# Patient Record
Sex: FEMALE | Race: WHITE | NOT HISPANIC OR LATINO | URBAN - METROPOLITAN AREA
[De-identification: names, ages, dates, MRNs, and addresses within clinical notes are randomized per-mention and may not be internally consistent; named-entity substitution may affect disease eponyms.]

---

## 2021-08-16 ENCOUNTER — APPOINTMENT (RX ONLY)
Dept: URBAN - METROPOLITAN AREA CLINIC 23 | Facility: CLINIC | Age: 51
Setting detail: DERMATOLOGY
End: 2021-08-16

## 2021-08-16 DIAGNOSIS — Z41.9 ENCOUNTER FOR PROCEDURE FOR PURPOSES OTHER THAN REMEDYING HEALTH STATE, UNSPECIFIED: ICD-10-CM

## 2021-08-16 PROCEDURE — ? PICOWAY LASER

## 2021-08-16 NOTE — PROCEDURE: PICOWAY LASER
Spot Size: 6.0 mm
Spot Size: 4.0 mm
Wavelength: 532 nm
Treatment Number: 1
Total Pulses: [address] [facility]
Total Pulses: 2 passes 725 pulse
Spot Size: 2.0 mm
Spot Size: 6.5 mm
Post-Care Instructions: I reviewed with the patient in detail post-care instructions. Patient should avoid sun for a minimum of 4 weeks before and after treatment.
External Cooling Fan Speed: 0
Frequency (Hz): 6Hz
Handpiece: Resolve
Fluence (J/Cm2): 2
Hide Pulse Duration?: No
Fluence (J/Cm2): 1.9
Fluence (J/Cm2): 0.6
Pulse Duration: 2.5 ms
Handpiece: n/a nm
Pre-Procedure: Prior to proceeding the treatment areas were cleaned and all present put on their eye protection.
Post-Procedure Care: Immediate endpoint: perifollicular erythema and edema. Vaseline and ice applied. Post care reviewed with patient.
Wavelength: 1064 nm
Detail Level: Detailed
Anesthesia Type: 1% lidocaine with epinephrine
Consent: Written consent obtained, risks reviewed including but not limited to crusting, scabbing, blistering, scarring, darker or lighter pigmentary change, paradoxical hair regrowth, incomplete removal of hair and infection.

## 2021-09-28 ENCOUNTER — APPOINTMENT (RX ONLY)
Dept: URBAN - METROPOLITAN AREA CLINIC 23 | Facility: CLINIC | Age: 51
Setting detail: DERMATOLOGY
End: 2021-09-28

## 2021-09-28 DIAGNOSIS — Z41.9 ENCOUNTER FOR PROCEDURE FOR PURPOSES OTHER THAN REMEDYING HEALTH STATE, UNSPECIFIED: ICD-10-CM

## 2021-09-28 PROCEDURE — ? PICOWAY LASER

## 2021-09-28 NOTE — PROCEDURE: PICOWAY LASER
Detail Level: Detailed
External Cooling Fan Speed: 0
Anesthesia Type: 1% lidocaine with epinephrine
Total Pulses: 2 passes 725 pulse
Spot Size: 2.0 mm
Wavelength: 532 nm
Post-Care Instructions: I reviewed with the patient in detail post-care instructions. Patient should avoid sun for a minimum of 4 weeks before and after treatment.
Handpiece: Resolve
Handpiece: Zoom
Location Override: spot
Hide Pulse Duration?: No
Fluence (J/Cm2): 0.7
Fluence (J/Cm2): 2.1
Frequency (Hz): Nichelle Nettles
Frequency (Hz): 1Hz
Pulse Duration: 2.5 ms
Pre-Procedure: Prior to proceeding the treatment areas were cleaned and all present put on their eye protection.
Handpiece: n/a nm
Fluence (J/Cm2): 2
Spot Size: 6.5 mm
Spot Size: 4.0 mm
Post-Procedure Care: Immediate endpoint: perifollicular erythema and edema. Vaseline and ice applied. Post care reviewed with patient.
Spot Size: 6.0 mm
Consent: Written consent obtained, risks reviewed including but not limited to crusting, scabbing, blistering, scarring, darker or lighter pigmentary change, paradoxical hair regrowth, incomplete removal of hair and infection.
Wavelength: 1064 nm

## 2021-11-08 ENCOUNTER — APPOINTMENT (RX ONLY)
Dept: URBAN - METROPOLITAN AREA CLINIC 23 | Facility: CLINIC | Age: 51
Setting detail: DERMATOLOGY
End: 2021-11-08

## 2021-11-08 DIAGNOSIS — Z41.9 ENCOUNTER FOR PROCEDURE FOR PURPOSES OTHER THAN REMEDYING HEALTH STATE, UNSPECIFIED: ICD-10-CM

## 2021-11-08 PROCEDURE — ? PICOWAY LASER

## 2021-11-08 PROCEDURE — ? IN-HOUSE DISPENSING PHARMACY

## 2021-11-08 NOTE — PROCEDURE: PICOWAY LASER
Pulse Duration: 2.5 ms
Wavelength: 1064 nm
Handpiece: n/a nm
Treatment Number: 3
Post-Procedure Care: Immediate endpoint: perifollicular erythema and edema. Vaseline and ice applied. Post care reviewed with patient.
Wavelength: 532 nm
External Cooling Fan Speed: 0
Spot Size: 6.5 mm
Consent: Written consent obtained, risks reviewed including but not limited to crusting, scabbing, blistering, scarring, darker or lighter pigmentary change, paradoxical hair regrowth, incomplete removal of hair and infection.
Spot Size: 6.0 mm
Detail Level: Detailed
Anesthesia Type: 1% lidocaine with epinephrine
Frequency (Hz): 6Hz
Frequency (Hz): 6 Hz
Spot Size: 2.0 mm
Post-Care Instructions: I reviewed with the patient in detail post-care instructions. Patient should avoid sun for a minimum of 4 weeks before and after treatment.
Total Pulses: 2 passes 725 pulse
Handpiece: Resolve
Pre-Procedure: Prior to proceeding the treatment areas were cleaned and all present put on their eye protection.
Fluence (J/Cm2): 2
Fluence (J/Cm2): 2.1
Fluence (J/Cm2): 1.7
Hide Pulse Duration?: No

## 2021-11-08 NOTE — PROCEDURE: IN-HOUSE DISPENSING PHARMACY
Product 49 Price/Unit (In Dollars): 0
Product 2 Unit Type: tablets
Product 75 Unit Type: mg
Product 11 Unit Type: bottle(s)
Product 9 Amount/Unit (Numbers Only): 1
Product 14 Unit Type: tube(s)
Name Of Product 3: Tretinoin 0.05% cream
Product 7 Application Directions: Apply to lashes at bedtime daily as indicated
Name Of Product 12: Retinol Lite
Product 7 Unit Type: ml
Name Of Product 8: TNS Essential
Product 1 Price/Unit (In Dollars): 0.00
Product 12 Application Directions: Apply a pea size amount to the entire face at bedtime.
Product 3 Application Directions: Mix with cerave cream and apply on damp skin daily to body.
Product 3 Unit Type: grams
Product 22 Application Directions: Apply to the affected area of the face
Product 13 Application Directions: Apply to the entire face in the Am and pm
Product 6 Application Directions: Take one tablet for swelling today at the office as indicated and one tablet tomorrow
Name Of Product 11: Clearing gel
Name Of Product 2: Valtrex
Name Of Product 14: Skin Better Interfuse eye treatment cream
Name Of Product 7: Latisse 5ml
Product 2 Application Directions: apply to affected areas left lower leg am and pm  as indicated
Product 11 Application Directions: Apply a thin layer to the acne prone areas in the AM
Name Of Product 19: Diazepam 5mg
Product 14 Application Directions: Apply to the under eyes in the Am and pm
Render Product Pricing In Note: No
Product 21 Application Directions: Take one tablet 30 minutes prior to procedure
Product 9 Unit Type: jar(s)
Product 7 Amount/Unit (Numbers Only): 5
Name Of Product 10: Valtrex 500 mg
Name Of Product 1: Hydroquinone 4% / Tretinoin 0.05% / Fluocinolone 0.01%
Product 5 Application Directions: Take one tablet today after procedure with full glass of water as indicated
Name Of Product 13: Skin Better Even tone correcting serum
Name Of Product 22: Brightening Pads
Product 3 Amount/Unit (Numbers Only): [address] [facility]
Name Of Product 6: Prednisone 20mg
Product 10 Application Directions: Take 1 tablet 2 times daily for 3 days
Product 1 Application Directions: Apply thin layer to right cheek every night  at bedtime only.
Name Of Product 4: Valium 5 mg
Product 8 Application Directions: Apply to the clean face in the AM and PM daily
Name Of Product 16: Brightening pads 4%
Name Of Product 9: Clearing tone pads
Product 4 Application Directions: Take one tablet half hour today prior to procedure as indicated.  Dispense in office
Detail Level: Zone
Product 16 Application Directions: Apply to face area once daily as indicated
Name Of Product 21: Valium 5mg
Product 2 Amount/Unit (Numbers Only): 6
Name Of Product 5: loratadine 10 mg
Product 9 Application Directions: Apply to the acne prone areas in the Am and Pm

## 2022-01-24 ENCOUNTER — APPOINTMENT (RX ONLY)
Dept: URBAN - METROPOLITAN AREA CLINIC 23 | Facility: CLINIC | Age: 52
Setting detail: DERMATOLOGY
End: 2022-01-24

## 2022-01-24 DIAGNOSIS — Z41.9 ENCOUNTER FOR PROCEDURE FOR PURPOSES OTHER THAN REMEDYING HEALTH STATE, UNSPECIFIED: ICD-10-CM

## 2022-01-24 PROCEDURE — ? IN-HOUSE DISPENSING PHARMACY

## 2022-01-24 NOTE — PROCEDURE: IN-HOUSE DISPENSING PHARMACY
Product 46 Amount/Unit (Numbers Only): 0
Name Of Product 9: Clearing tone pads
Product 17 Unit Type: mg
Product 2 Unit Type: tablets
Name Of Product 3: Tretinoin 0.05% cream
Product 7 Application Directions: Apply to lashes at bedtime daily as indicated
Product 13 Application Directions: Apply to the entire face in the Am and pm
Product 22 Application Directions: Apply to the affected area of the face
Product 11 Refills: 1
Product 3 Units Dispensed: 2
Product 7 Unit Type: ml
Product 13 Unit Type: bottle(s)
Product 1 Price/Unit (In Dollars): 0.00
Product 9 Application Directions: Apply to the acne prone areas in the Am and Pm
Name Of Product 8: TNS Essential
Product 3 Application Directions: Apply a pea size amount to the entire face at night
Name Of Product 14: Skin Better Interfuse eye treatment cream
Product 9 Unit Type: jar(s)
Product 3 Unit Type: grams
Product 1 Unit Type: tube(s)
Product 6 Application Directions: Take one tablet for swelling today at the office as indicated and one tablet tomorrow
Name Of Product 2: Valtrex
Product 12 Application Directions: Apply a pea size amount to the entire face at bedtime.
Product 21 Application Directions: Take one tablet 30 minutes prior to procedure
Product 8 Application Directions: Apply to the clean face in the AM and PM daily
Name Of Product 7: Latisse 5ml
Name Of Product 22: Brightening Pads
Product 2 Application Directions: apply to affected areas left lower leg am and pm  as indicated
Name Of Product 13: Skin Better Even tone correcting serum
Render Product Pricing In Note: No
Name Of Product 5: loratadine 10 mg
Product 11 Application Directions: Apply a thin layer to the acne prone areas in the AM
Name Of Product 1: Hydroquinone 4% / Tretinoin 0.05% / Fluocinolone 0.01%
Product 5 Application Directions: Take one tablet today after procedure with full glass of water as indicated
Product 7 Amount/Unit (Numbers Only): 5
Name Of Product 12: Retinol Lite
Product 3 Amount/Unit (Numbers Only): [address]
Name Of Product 21: Valium 5mg
Product 1 Application Directions: Apply thin layer to right cheek every night  at bedtime only.
Name Of Product 6: Prednisone 20mg
Product 14 Application Directions: Apply to the under eyes in the Am and pm
Name Of Product 10: Valtrex 500 mg
Name Of Product 19: Diazepam 5mg
Name Of Product 4: Valium 5 mg
Product 10 Application Directions: Take 1 tablet 2 times daily for 3 days
Product 4 Application Directions: Take one tablet half hour today prior to procedure as indicated.  Dispense in office
Detail Level: Zone
Product 2 Amount/Unit (Numbers Only): 6
Name Of Product 11: Clearing gel

## 2022-03-07 ENCOUNTER — APPOINTMENT (RX ONLY)
Dept: URBAN - METROPOLITAN AREA CLINIC 23 | Facility: CLINIC | Age: 52
Setting detail: DERMATOLOGY
End: 2022-03-07

## 2022-03-07 DIAGNOSIS — Z41.9 ENCOUNTER FOR PROCEDURE FOR PURPOSES OTHER THAN REMEDYING HEALTH STATE, UNSPECIFIED: ICD-10-CM

## 2022-03-07 PROCEDURE — ? RECOMMENDATIONS

## 2022-03-07 PROCEDURE — ? PULSED-DYE LASER

## 2022-03-07 NOTE — PROCEDURE: PULSED-DYE LASER
Post-Care Instructions: I reviewed with the patient in detail post-care instructions. Patient should stay away from the sun and wear sun protection until treated areas are fully healed.
Post-Procedure Care: Sunscreen applied. Post care reviewed with patient.
Spot Size: 7 mm
Spot Size: 10 mm
Cryogen Time (Ms): [address] [address]
Treated Area: medium area
Cryogen Time (Ms): 10
Consent: Written consent obtained, risks reviewed including but not limited to crusting, scabbing, blistering, scarring, darker or lighter pigmentary change, incidental hair removal, bruising, and/or incomplete removal.
Pulse Duration: 10 ms
Pulse Duration: 20 ms
Location Override: hemangioma right cheek x 1
Fluence In J/Cm2 (Optional): 10.0
Detail Level: Zone
Price (Use Numbers Only, No Special Characters Or $): 0.00
Fluence In J/Cm2 (Optional): 7.00
Delay Time (Ms): [facility]
Delay Time (Ms): 20
Spot Size: 10x3 mm
Laser Type: Vbeam 595nm
Treated Area: small area
Cryogen Time (Ms): 30
Pulse Duration: 6 ms

## 2022-03-23 ENCOUNTER — APPOINTMENT (RX ONLY)
Dept: URBAN - METROPOLITAN AREA CLINIC 23 | Facility: CLINIC | Age: 52
Setting detail: DERMATOLOGY
End: 2022-03-23

## 2022-03-23 DIAGNOSIS — Z41.9 ENCOUNTER FOR PROCEDURE FOR PURPOSES OTHER THAN REMEDYING HEALTH STATE, UNSPECIFIED: ICD-10-CM

## 2022-03-23 PROCEDURE — ? ULTHERA

## 2022-03-23 NOTE — PROCEDURE: ULTHERA
Post-Care Instructions: I reviewed with the patient in detail post-care instructions. Patient should stay away from the sun and wear sun protection until treated areas are fully healed.
Pre-Procedures Photographs: Yes
Patient Reported Discomfort: 0
Anesthesia Type: 1% lidocaine with epinephrine
Total Lines (Use Numbers Only, No Special Characters Or $): [address]
Use Distraction Techniques In Note: No
Anesthesia Volume In Cc: 6
Consent: Written consent obtained, risks reviewed including but not limited to crusting, scabbing, blistering, scarring, darker or lighter pigmentary change, and/or incomplete improvement.
Detail Level: Zone

## 2022-04-19 ENCOUNTER — APPOINTMENT (RX ONLY)
Dept: URBAN - METROPOLITAN AREA CLINIC 23 | Facility: CLINIC | Age: 52
Setting detail: DERMATOLOGY
End: 2022-04-19

## 2022-04-19 DIAGNOSIS — Z41.9 ENCOUNTER FOR PROCEDURE FOR PURPOSES OTHER THAN REMEDYING HEALTH STATE, UNSPECIFIED: ICD-10-CM

## 2022-04-19 PROCEDURE — ? PICOWAY LASER

## 2022-04-19 NOTE — PROCEDURE: PICOWAY LASER
Frequency (Hz): 5Hz
Treatment Number: 0
Pre-Procedure: Prior to proceeding the treatment areas were cleaned and all present put on their eye protection.
Spot Size: 6.0 mm x 6.0 mm
Location Override: face
Handpiece: Resolve
Wavelength: 1064 nm
Hide Pulse Duration?: No
Post-Procedure Care: Immediate endpoint: perifollicular erythema and edema. Vaseline and ice applied. Post care reviewed with patient.
Location Override: neck NC
Anesthesia Type: 1% lidocaine with epinephrine
Spot Size: 6.5 mm
Spot Size: 2.0 mm
Total Pulses: 2 passes 725 pulse
Detail Level: Detailed
Fluence (J/Cm2): 2.1
Pulse Duration: 2.5 ms
Consent: Written consent obtained, risks reviewed including but not limited to crusting, scabbing, blistering, scarring, darker or lighter pigmentary change, paradoxical hair regrowth, incomplete removal of hair and infection.
Wavelength: 532 nm
Fluence (J/Cm2): 1.5
Fluence (J/Cm2): 2
Post-Care Instructions: I reviewed with the patient in detail post-care instructions. Patient should avoid sun for a minimum of 4 weeks before and after treatment.

## 2022-06-02 ENCOUNTER — APPOINTMENT (RX ONLY)
Dept: URBAN - METROPOLITAN AREA CLINIC 23 | Facility: CLINIC | Age: 52
Setting detail: DERMATOLOGY
End: 2022-06-02

## 2022-06-02 DIAGNOSIS — Z41.9 ENCOUNTER FOR PROCEDURE FOR PURPOSES OTHER THAN REMEDYING HEALTH STATE, UNSPECIFIED: ICD-10-CM

## 2022-06-02 PROCEDURE — ? PICOWAY LASER

## 2022-06-02 NOTE — PROCEDURE: PICOWAY LASER
Treatment Number: 0
Pulse Duration: 2.5 ms
Post-Care Instructions: I reviewed with the patient in detail post-care instructions. Patient should avoid sun for a minimum of 4 weeks before and after treatment.
Wavelength: 532 nm
Fluence (J/Cm2): 2
Fluence (J/Cm2): 1.9
Frequency (Hz): Geno Nipple
Consent: Written consent obtained, risks reviewed including but not limited to crusting, scabbing, blistering, scarring, darker or lighter pigmentary change, paradoxical hair regrowth, incomplete removal of hair and infection.
Spot Size: 6.0 mm x 6.0 mm
Frequency (Hz): Linsey President
Location Override: face
Handpiece: Resolve
Post-Procedure Care: Immediate endpoint: perifollicular erythema and edema. Vaseline and ice applied. Post care reviewed with patient.
Location Override: neck
Hide Pulse Duration?: No
Pre-Procedure: Prior to proceeding the treatment areas were cleaned and all present put on their eye protection.
Wavelength: 1064 nm
Anesthesia Type: 1% lidocaine with epinephrine
Total Pulses: 2 passes 725 pulse
Fluence (J/Cm2): 2.1
no chest pain, no cough, and no shortness of breath.
Detail Level: Detailed
Spot Size: 2.0 mm
Spot Size: 6.5 mm

## 2022-09-26 ENCOUNTER — APPOINTMENT (RX ONLY)
Dept: URBAN - METROPOLITAN AREA CLINIC 23 | Facility: CLINIC | Age: 52
Setting detail: DERMATOLOGY
End: 2022-09-26

## 2022-09-26 DIAGNOSIS — Z41.9 ENCOUNTER FOR PROCEDURE FOR PURPOSES OTHER THAN REMEDYING HEALTH STATE, UNSPECIFIED: ICD-10-CM

## 2022-09-26 DIAGNOSIS — D18.0 HEMANGIOMA: ICD-10-CM

## 2022-09-26 DIAGNOSIS — L81.4 OTHER MELANIN HYPERPIGMENTATION: ICD-10-CM

## 2022-09-26 DIAGNOSIS — L82.1 OTHER SEBORRHEIC KERATOSIS: ICD-10-CM

## 2022-09-26 PROBLEM — D18.01 HEMANGIOMA OF SKIN AND SUBCUTANEOUS TISSUE: Status: ACTIVE | Noted: 2022-09-26

## 2022-09-26 PROCEDURE — ? COUNSELING

## 2022-09-26 PROCEDURE — 99213 OFFICE O/P EST LOW 20 MIN: CPT

## 2022-09-26 PROCEDURE — ? SCLEROTHERAPY (COSMETIC)

## 2022-09-26 ASSESSMENT — LOCATION DETAILED DESCRIPTION DERM
LOCATION DETAILED: EPIGASTRIC SKIN
LOCATION DETAILED: SUPERIOR THORACIC SPINE
LOCATION DETAILED: RIGHT MID-UPPER BACK
LOCATION DETAILED: RIGHT PROXIMAL PRETIBIAL REGION
LOCATION DETAILED: LEFT PROXIMAL PRETIBIAL REGION

## 2022-09-26 ASSESSMENT — LOCATION SIMPLE DESCRIPTION DERM
LOCATION SIMPLE: LEFT PRETIBIAL REGION
LOCATION SIMPLE: ABDOMEN
LOCATION SIMPLE: RIGHT PRETIBIAL REGION
LOCATION SIMPLE: UPPER BACK
LOCATION SIMPLE: RIGHT UPPER BACK

## 2022-09-26 ASSESSMENT — LOCATION ZONE DERM
LOCATION ZONE: TRUNK
LOCATION ZONE: LEG

## 2022-09-26 NOTE — PROCEDURE: SCLEROTHERAPY (COSMETIC)
Number Of Injections (Will Not Render If 0): 0
Consent was obtained with risks, benefits, and alternatives discussed for the above procedures. Photographs were taken.
Disposition: Compression stockings and short stretch elastic bandages were placed postoperatively.
Price (Use Numbers Only, No Special Characters Or $): [address]
Sclerosant Volume (Cc): 2
Expiration Date A (Month Year): 07/24
Sclerosant Volume (Cc): 10
Lot # [id number]: [id number]
Detail Level: Detailed
Post-Care Instructions: Compression for 3 weeks is critical to optimize your recovery and results. Compliance with compression helps to prevent blood clots and minimizes pain, swelling, bruising, skin discoloration (staining) and the recurrence of vessels. \nMaterials to gather for your wound care (all available over the counter)      \nCompression stockings x 2 pairs, 4 x 4 gauze, Comprilan wrap: 8 cm and 10 cm width wrap, Medical adhesive tape       \nCompression and Wound care;  Leg compression for 3 weeks is gutierrez to your success and safety. Compression at night is only needed the first day. After that, compression is needed only during waking hours. However, if your leg feels better with compression at night, then you may continue compression at night as tolerated. \nAfter the sclerotherapy procedure, 2 layers compression will be placed. \n1. On the skin, folded or flat gauze will cover the treated areas. \n2. A compression wrap (Comprilan) will be wrapped around your leg over the gauze. Once the compression wrap is in place, a compression stocking will be worn. This two layer  compression (wrap plus stocking) should be worn for the first 24 hours if tolerated. \n3. After 24 hours, remove all compressions and dressings and just wear the compression stockings only during waking hours. \nYou will need to wear compression stockings for three weeks after your procedure, unless your physician instructs you otherwise. \nActivity       \n - It is important NOT to be sitting or lying down for several hours after surgery. You may begin walking immediately after surgery. This is good for you, but take it easy. \n - For 2 days after treatment: Avoid aerobic exercise, weight training, and all other types of exertion that increase your breathing and pulse rates. \n - Do not get a tan for one month after sclerotherapy. Tanning increases your risk of skin discoloration. \nBathing       \nAfter 24 hours, you may shower or bathe in tepid water, but keep the compression stocking on. Avoid immersion in hot tubs. \nDiscomfort . For pain or discomfort:       \n - You may take acetaminophen (TylenolTM, Extra-Strength TylenolTM or DatrilTM) as directed. \n - Do not use aspirin, products containing aspirin, or ibuprofen (for example AdvilTM or MotrinTM) for five days after your surgery, unless approved by your physician. \n - Dietary restrictions Do not drink alcoholic beverages for two days after your sclerotherapy procedure. \nPossible side effects following sclerotherapy  After sclerotherapy, mild swelling is expected. The injection sites may also become bruised or gray. You may also experience one or more of the following side effects, which almost always go away within one to four months:       \n - Darkening of the injected veins       \n - Brownish staining of the skin       \n - Small clotted vessels under the surface of the skin that you can feel      \n - Bruising of the injection sites       \nWhat to do about bruising  This will resolve within 2-3 weeks. If you wish the bruising to disappear sooner, then applying Arnicare cream (over the counter, health food stores) will help. \nWhat to do if you feel small clotted vessels under the surface of the skin. \n - Call us for a follow up appointment. These small clots can be drained through a small nick. \n - Draining these small clots will help you heal faster and with less discoloration.       \nWhen to contact your physician       \nContact your physician if you experience any of the following:       \n - Treated areas become increasingly sore, tender, red or warm       \n - Acetaminophen does not relieve your discomfort       \n - Injection sites turn black or the skin around the site breaks down       \n - Ulceration of the injection sites       \n - You develop blisters from the tape       \n - You develop significant swelling or pain in the leg       \n - Darkening of large areas of the skin or foot

## 2022-11-08 ENCOUNTER — APPOINTMENT (RX ONLY)
Dept: URBAN - METROPOLITAN AREA CLINIC 23 | Facility: CLINIC | Age: 52
Setting detail: DERMATOLOGY
End: 2022-11-08

## 2022-11-08 DIAGNOSIS — Z41.9 ENCOUNTER FOR PROCEDURE FOR PURPOSES OTHER THAN REMEDYING HEALTH STATE, UNSPECIFIED: ICD-10-CM

## 2022-11-08 PROCEDURE — ? INVENTORY

## 2022-11-08 PROCEDURE — ? SCLEROTHERAPY (COSMETIC)

## 2022-11-08 ASSESSMENT — LOCATION SIMPLE DESCRIPTION DERM: LOCATION SIMPLE: LEFT PRETIBIAL REGION

## 2022-11-08 ASSESSMENT — LOCATION ZONE DERM: LOCATION ZONE: LEG

## 2022-11-08 ASSESSMENT — LOCATION DETAILED DESCRIPTION DERM: LOCATION DETAILED: LEFT PROXIMAL PRETIBIAL REGION

## 2022-11-08 NOTE — PROCEDURE: SCLEROTHERAPY (COSMETIC)
Post-Care Instructions: Compression for 3 weeks is critical to optimize your recovery and results. Compliance with compression helps to prevent blood clots and minimizes pain, swelling, bruising, skin discoloration (staining) and the recurrence of vessels. \nMaterials to gather for your wound care (all available over the counter)      \nCompression stockings x 2 pairs, 4 x 4 gauze, Comprilan wrap: 8 cm and 10 cm width wrap, Medical adhesive tape       \nCompression and Wound care;  Leg compression for 3 weeks is gutierrez to your success and safety. Compression at night is only needed the first day. After that, compression is needed only during waking hours. However, if your leg feels better with compression at night, then you may continue compression at night as tolerated. \nAfter the sclerotherapy procedure, 2 layers compression will be placed. \n1. On the skin, folded or flat gauze will cover the treated areas. \n2. A compression wrap (Comprilan) will be wrapped around your leg over the gauze. Once the compression wrap is in place, a compression stocking will be worn. This two layer  compression (wrap plus stocking) should be worn for the first 24 hours if tolerated. \n3. After 24 hours, remove all compressions and dressings and just wear the compression stockings only during waking hours. \nYou will need to wear compression stockings for three weeks after your procedure, unless your physician instructs you otherwise. \nActivity       \n - It is important NOT to be sitting or lying down for several hours after surgery. You may begin walking immediately after surgery. This is good for you, but take it easy. \n - For 2 days after treatment: Avoid aerobic exercise, weight training, and all other types of exertion that increase your breathing and pulse rates. \n - Do not get a tan for one month after sclerotherapy. Tanning increases your risk of skin discoloration. \nBathing       \nAfter 24 hours, you may shower or bathe in tepid water, but keep the compression stocking on. Avoid immersion in hot tubs. \nDiscomfort . For pain or discomfort:       \n - You may take acetaminophen (TylenolTM, Extra-Strength TylenolTM or DatrilTM) as directed. \n - Do not use aspirin, products containing aspirin, or ibuprofen (for example AdvilTM or MotrinTM) for five days after your surgery, unless approved by your physician. \n - Dietary restrictions Do not drink alcoholic beverages for two days after your sclerotherapy procedure. \nPossible side effects following sclerotherapy  After sclerotherapy, mild swelling is expected. The injection sites may also become bruised or gray. You may also experience one or more of the following side effects, which almost always go away within one to four months:       \n - Darkening of the injected veins       \n - Brownish staining of the skin       \n - Small clotted vessels under the surface of the skin that you can feel      \n - Bruising of the injection sites       \nWhat to do about bruising  This will resolve within 2-3 weeks. If you wish the bruising to disappear sooner, then applying Arnicare cream (over the counter, health food stores) will help. \nWhat to do if you feel small clotted vessels under the surface of the skin. \n - Call us for a follow up appointment. These small clots can be drained through a small nick. \n - Draining these small clots will help you heal faster and with less discoloration.       \nWhen to contact your physician       \nContact your physician if you experience any of the following:       \n - Treated areas become increasingly sore, tender, red or warm       \n - Acetaminophen does not relieve your discomfort       \n - Injection sites turn black or the skin around the site breaks down       \n - Ulceration of the injection sites       \n - You develop blisters from the tape       \n - You develop significant swelling or pain in the leg       \n - Darkening of large areas of the skin or foot
Detail Level: Detailed
Price (Use Numbers Only, No Special Characters Or $): [address]
Sclerosant Volume (Cc): 2
Sclerosant Volume (Cc): 10
Lot # [id number]: [id number]
Sclerosant Volume (Cc): 1
Number Of Injections (Will Not Render If 0): 0
Consent was obtained with risks, benefits, and alternatives discussed for the above procedures. Photographs were taken.
Expiration Date A (Month Year): 07/24
Disposition: Compression stockings and short stretch elastic bandages were placed postoperatively.

## 2023-04-11 ENCOUNTER — APPOINTMENT (RX ONLY)
Dept: URBAN - METROPOLITAN AREA CLINIC 23 | Facility: CLINIC | Age: 53
Setting detail: DERMATOLOGY
End: 2023-04-11

## 2023-04-11 DIAGNOSIS — Z41.9 ENCOUNTER FOR PROCEDURE FOR PURPOSES OTHER THAN REMEDYING HEALTH STATE, UNSPECIFIED: ICD-10-CM

## 2023-04-11 PROCEDURE — ? PICOWAY LASER

## 2023-04-11 NOTE — PROCEDURE: PICOWAY LASER
Frequency (Hz): Nicole Lombardo
Wavelength: 532 nm
Location Override: dark spots on face
Price (Use Numbers Only, No Special Characters Or $): [employer] 354
Fluence (J/Cm2): 2
Pulse Duration: 2.5 ms
Post-Care Instructions: I reviewed with the patient in detail post-care instructions. Patient should avoid sun for a minimum of 4 weeks before and after treatment.
Pre-Procedure: Prior to proceeding the treatment areas were cleaned and all present put on their eye protection.
Frequency (Hz): 6Hz
Treatment Number: 0
Handpiece: Resolve
Hide Pulse Duration?: No
Post-Procedure Care: Immediate endpoint: perifollicular erythema and edema. Vaseline and ice applied. Post care reviewed with patient.
Spot Size: 4.0 mm
Spot Size: 6.0 mm x 6.0 mm
Detail Level: Detailed
Spot Size: 2.0 mm
Anesthesia Type: 1% lidocaine with epinephrine
Spot Size: 6.5 mm
Total Pulses: 2 passes 725 pulse
Fluence (J/Cm2): 0.6
Handpiece: Zoom
Wavelength: 1064 nm
Consent: Written consent obtained, risks reviewed including but not limited to crusting, scabbing, blistering, scarring, darker or lighter pigmentary change, paradoxical hair regrowth, incomplete removal of hair and infection.
Fluence (J/Cm2): 1.9

## 2023-05-22 ENCOUNTER — APPOINTMENT (RX ONLY)
Dept: URBAN - METROPOLITAN AREA CLINIC 23 | Facility: CLINIC | Age: 53
Setting detail: DERMATOLOGY
End: 2023-05-22

## 2023-05-22 DIAGNOSIS — Z41.9 ENCOUNTER FOR PROCEDURE FOR PURPOSES OTHER THAN REMEDYING HEALTH STATE, UNSPECIFIED: ICD-10-CM

## 2023-05-22 PROCEDURE — ? PICOWAY LASER

## 2023-05-22 NOTE — PROCEDURE: PICOWAY LASER
Pulse Duration: 2.5 ms
Wavelength: 532 nm
Consent: Written consent obtained, risks reviewed including but not limited to crusting, scabbing, blistering, scarring, darker or lighter pigmentary change, paradoxical hair regrowth, incomplete removal of hair and infection.
Treatment Number: 0
Pre-Procedure: Prior to proceeding the treatment areas were cleaned and all present put on their eye protection.
Fluence (J/Cm2): 0.6
Frequency (Hz): Esthela Ackerman
Detail Level: Detailed
Price (Use Numbers Only, No Special Characters Or $): Aria 354
Wavelength: 1064 nm
Spot Size: 6.0 mm
Fluence (J/Cm2): 2
Post-Care Instructions: I reviewed with the patient in detail post-care instructions. Patient should avoid sun for a minimum of 4 weeks before and after treatment.
Post-Procedure Care: Immediate endpoint: perifollicular erythema and edema. Vaseline and ice applied. Post care reviewed with patient.
Location Override: face
Hide Pulse Duration?: No
Spot Size: 4.0 mm
Anesthesia Type: 1% lidocaine with epinephrine
Spot Size: 2.0 mm
Total Pulses: 2 passes 725 pulse
Handpiece: Resolve
Total Pulses: [address] [facility]
Spot Size: 6.5 mm
Fluence (J/Cm2): 1.9